# Patient Record
Sex: MALE | Race: WHITE | Employment: FULL TIME | URBAN - METROPOLITAN AREA
[De-identification: names, ages, dates, MRNs, and addresses within clinical notes are randomized per-mention and may not be internally consistent; named-entity substitution may affect disease eponyms.]

---

## 2018-07-17 ENCOUNTER — TELEPHONE (OUTPATIENT)
Dept: FAMILY MEDICINE CLINIC | Age: 48
End: 2018-07-17

## 2018-07-20 ENCOUNTER — HOSPITAL ENCOUNTER (OUTPATIENT)
Age: 48
Discharge: HOME | End: 2018-07-20
Payer: MEDICAID

## 2018-07-20 ENCOUNTER — OFFICE VISIT (OUTPATIENT)
Dept: FAMILY MEDICINE CLINIC | Age: 48
End: 2018-07-20
Payer: MEDICAID

## 2018-07-20 VITALS
HEIGHT: 68 IN | RESPIRATION RATE: 10 BRPM | WEIGHT: 129.4 LBS | TEMPERATURE: 97.9 F | BODY MASS INDEX: 19.61 KG/M2 | HEART RATE: 60 BPM | DIASTOLIC BLOOD PRESSURE: 62 MMHG | SYSTOLIC BLOOD PRESSURE: 95 MMHG

## 2018-07-20 DIAGNOSIS — R52 ACHES: ICD-10-CM

## 2018-07-20 DIAGNOSIS — R76.8 IGG GLIADIN ANTIBODY POSITIVE: ICD-10-CM

## 2018-07-20 DIAGNOSIS — R53.83 TIREDNESS: ICD-10-CM

## 2018-07-20 DIAGNOSIS — D72.10 EOSINOPHILIA: ICD-10-CM

## 2018-07-20 DIAGNOSIS — M54.40 ACUTE RIGHT-SIDED LOW BACK PAIN WITH SCIATICA, SCIATICA LATERALITY UNSPECIFIED: ICD-10-CM

## 2018-07-20 DIAGNOSIS — M75.02 ADHESIVE CAPSULITIS OF LEFT SHOULDER: Primary | ICD-10-CM

## 2018-07-20 DIAGNOSIS — J45.20 MILD INTERMITTENT ASTHMA WITHOUT COMPLICATION: ICD-10-CM

## 2018-07-20 DIAGNOSIS — R73.9 HYPERGLYCEMIA: ICD-10-CM

## 2018-07-20 DIAGNOSIS — K21.00 GASTROESOPHAGEAL REFLUX DISEASE WITH ESOPHAGITIS: ICD-10-CM

## 2018-07-20 DIAGNOSIS — F90.9 ATTENTION DEFICIT HYPERACTIVITY DISORDER (ADHD), UNSPECIFIED ADHD TYPE: ICD-10-CM

## 2018-07-20 DIAGNOSIS — M75.01 ADHESIVE CAPSULITIS OF RIGHT SHOULDER: ICD-10-CM

## 2018-07-20 DIAGNOSIS — E78.2 MIXED HYPERLIPIDEMIA: ICD-10-CM

## 2018-07-20 DIAGNOSIS — M75.02 ADHESIVE CAPSULITIS OF LEFT SHOULDER: ICD-10-CM

## 2018-07-20 PROBLEM — M54.50 LOW BACK PAIN: Status: ACTIVE | Noted: 2018-07-20

## 2018-07-20 PROBLEM — J45.909 ASTHMA: Status: ACTIVE | Noted: 2018-07-20

## 2018-07-20 PROBLEM — E78.5 HLD (HYPERLIPIDEMIA): Status: ACTIVE | Noted: 2018-07-20

## 2018-07-20 LAB
ALBUMIN SERPL-MCNC: 4.8 G/DL (ref 3.5–5.1)
ALP BLD-CCNC: 40 U/L (ref 38–126)
ALT SERPL-CCNC: 15 U/L (ref 11–66)
AMYLASE: 69 U/L (ref 20–104)
ANION GAP SERPL CALCULATED.3IONS-SCNC: 15 MEQ/L (ref 8–16)
AST SERPL-CCNC: 16 U/L (ref 5–40)
AVERAGE GLUCOSE: 96 MG/DL (ref 70–126)
BASOPHILS # BLD: 0.9 %
BASOPHILS ABSOLUTE: 0 THOU/MM3 (ref 0–0.1)
BILIRUB SERPL-MCNC: 0.6 MG/DL (ref 0.3–1.2)
BILIRUBIN DIRECT: < 0.2 MG/DL (ref 0–0.3)
BUN BLDV-MCNC: 11 MG/DL (ref 7–22)
CALCIUM SERPL-MCNC: 9.8 MG/DL (ref 8.5–10.5)
CHLORIDE BLD-SCNC: 97 MEQ/L (ref 98–111)
CHOLESTEROL, TOTAL: 200 MG/DL (ref 100–199)
CO2: 28 MEQ/L (ref 23–33)
CORTISOL COLLECTION INFO: NORMAL
CORTISOL: 8.35 UG/DL
CREAT SERPL-MCNC: 0.7 MG/DL (ref 0.4–1.2)
EOSINOPHIL # BLD: 2 %
EOSINOPHILS ABSOLUTE: 0.1 THOU/MM3 (ref 0–0.4)
ERYTHROCYTE [DISTWIDTH] IN BLOOD BY AUTOMATED COUNT: 12.5 % (ref 11.5–14.5)
ERYTHROCYTE [DISTWIDTH] IN BLOOD BY AUTOMATED COUNT: 43.2 FL (ref 35–45)
ESTRADIOL LEVEL: 17.9 PG/ML
FOLATE: > 20 NG/ML (ref 4.8–24.2)
GFR SERPL CREATININE-BSD FRML MDRD: > 90 ML/MIN/1.73M2
GLUCOSE BLD-MCNC: 90 MG/DL (ref 70–108)
HBA1C MFR BLD: 5.2 % (ref 4.4–6.4)
HCT VFR BLD CALC: 44 % (ref 42–52)
HDLC SERPL-MCNC: 68 MG/DL
HEMOGLOBIN: 14.4 GM/DL (ref 14–18)
IMMATURE GRANS (ABS): 0.01 THOU/MM3 (ref 0–0.07)
IMMATURE GRANULOCYTES: 0.2 %
IRON: 116 UG/DL (ref 65–195)
LDL CHOLESTEROL CALCULATED: 118 MG/DL
LIPASE: 40.9 U/L (ref 5.6–51.3)
LYMPHOCYTES # BLD: 36.5 %
LYMPHOCYTES ABSOLUTE: 2 THOU/MM3 (ref 1–4.8)
MAGNESIUM: 2.1 MG/DL (ref 1.6–2.4)
MCH RBC QN AUTO: 30.8 PG (ref 26–33)
MCHC RBC AUTO-ENTMCNC: 32.7 GM/DL (ref 32.2–35.5)
MCV RBC AUTO: 94.2 FL (ref 80–94)
MONOCYTES # BLD: 8 %
MONOCYTES ABSOLUTE: 0.4 THOU/MM3 (ref 0.4–1.3)
NUCLEATED RED BLOOD CELLS: 0 /100 WBC
PLATELET # BLD: 230 THOU/MM3 (ref 130–400)
PMV BLD AUTO: 10.8 FL (ref 9.4–12.4)
POTASSIUM SERPL-SCNC: 3.6 MEQ/L (ref 3.5–5.2)
PTH INTACT: 46.4 PG/ML (ref 15–65)
RBC # BLD: 4.67 MILL/MM3 (ref 4.7–6.1)
RHEUMATOID FACTOR: < 10 IU/ML (ref 0–13)
SEDIMENTATION RATE, ERYTHROCYTE: 2 MM/HR (ref 0–10)
SEG NEUTROPHILS: 52.4 %
SEGMENTED NEUTROPHILS ABSOLUTE COUNT: 2.8 THOU/MM3 (ref 1.8–7.7)
SODIUM BLD-SCNC: 140 MEQ/L (ref 135–145)
T3 TOTAL: 101 NG/DL (ref 72–181)
T4 FREE: 1.65 NG/DL (ref 0.93–1.76)
TOTAL IRON BINDING CAPACITY: 289 UG/DL (ref 171–450)
TOTAL PROTEIN: 7.5 G/DL (ref 6.1–8)
TRIGL SERPL-MCNC: 70 MG/DL (ref 0–199)
TSH SERPL DL<=0.05 MIU/L-ACNC: 1.38 UIU/ML (ref 0.4–4.2)
URIC ACID: 4.1 MG/DL (ref 3.7–7)
VITAMIN B-12: > 2000 PG/ML (ref 211–911)
VITAMIN D 25-HYDROXY: 43 NG/ML (ref 30–100)
WBC # BLD: 5.4 THOU/MM3 (ref 4.8–10.8)

## 2018-07-20 PROCEDURE — 84481 FREE ASSAY (FT-3): CPT

## 2018-07-20 PROCEDURE — 36415 COLL VENOUS BLD VENIPUNCTURE: CPT

## 2018-07-20 PROCEDURE — 84436 ASSAY OF TOTAL THYROXINE: CPT

## 2018-07-20 PROCEDURE — 84439 ASSAY OF FREE THYROXINE: CPT

## 2018-07-20 PROCEDURE — 84432 ASSAY OF THYROGLOBULIN: CPT

## 2018-07-20 PROCEDURE — 82150 ASSAY OF AMYLASE: CPT

## 2018-07-20 PROCEDURE — 83735 ASSAY OF MAGNESIUM: CPT

## 2018-07-20 PROCEDURE — 84403 ASSAY OF TOTAL TESTOSTERONE: CPT

## 2018-07-20 PROCEDURE — 82627 DEHYDROEPIANDROSTERONE: CPT

## 2018-07-20 PROCEDURE — 83540 ASSAY OF IRON: CPT

## 2018-07-20 PROCEDURE — 82746 ASSAY OF FOLIC ACID SERUM: CPT

## 2018-07-20 PROCEDURE — 82785 ASSAY OF IGE: CPT

## 2018-07-20 PROCEDURE — 86141 C-REACTIVE PROTEIN HS: CPT

## 2018-07-20 PROCEDURE — 84550 ASSAY OF BLOOD/URIC ACID: CPT

## 2018-07-20 PROCEDURE — G8427 DOCREV CUR MEDS BY ELIG CLIN: HCPCS | Performed by: FAMILY MEDICINE

## 2018-07-20 PROCEDURE — 1036F TOBACCO NON-USER: CPT | Performed by: FAMILY MEDICINE

## 2018-07-20 PROCEDURE — 86038 ANTINUCLEAR ANTIBODIES: CPT

## 2018-07-20 PROCEDURE — 99204 OFFICE O/P NEW MOD 45 MIN: CPT | Performed by: FAMILY MEDICINE

## 2018-07-20 PROCEDURE — 85651 RBC SED RATE NONAUTOMATED: CPT

## 2018-07-20 PROCEDURE — 86800 THYROGLOBULIN ANTIBODY: CPT

## 2018-07-20 PROCEDURE — 84270 ASSAY OF SEX HORMONE GLOBUL: CPT

## 2018-07-20 PROCEDURE — 82608 B-12 BINDING CAPACITY: CPT

## 2018-07-20 PROCEDURE — 82248 BILIRUBIN DIRECT: CPT

## 2018-07-20 PROCEDURE — 82607 VITAMIN B-12: CPT

## 2018-07-20 PROCEDURE — 80053 COMPREHEN METABOLIC PANEL: CPT

## 2018-07-20 PROCEDURE — 82670 ASSAY OF TOTAL ESTRADIOL: CPT

## 2018-07-20 PROCEDURE — 83550 IRON BINDING TEST: CPT

## 2018-07-20 PROCEDURE — 82533 TOTAL CORTISOL: CPT

## 2018-07-20 PROCEDURE — 85025 COMPLETE CBC W/AUTO DIFF WBC: CPT

## 2018-07-20 PROCEDURE — 86430 RHEUMATOID FACTOR TEST QUAL: CPT

## 2018-07-20 PROCEDURE — 83090 ASSAY OF HOMOCYSTEINE: CPT

## 2018-07-20 PROCEDURE — 83525 ASSAY OF INSULIN: CPT

## 2018-07-20 PROCEDURE — 84681 ASSAY OF C-PEPTIDE: CPT

## 2018-07-20 PROCEDURE — 83516 IMMUNOASSAY NONANTIBODY: CPT

## 2018-07-20 PROCEDURE — 83036 HEMOGLOBIN GLYCOSYLATED A1C: CPT

## 2018-07-20 PROCEDURE — 82306 VITAMIN D 25 HYDROXY: CPT

## 2018-07-20 PROCEDURE — 82024 ASSAY OF ACTH: CPT

## 2018-07-20 PROCEDURE — 83690 ASSAY OF LIPASE: CPT

## 2018-07-20 PROCEDURE — 86376 MICROSOMAL ANTIBODY EACH: CPT

## 2018-07-20 PROCEDURE — 84480 ASSAY TRIIODOTHYRONINE (T3): CPT

## 2018-07-20 PROCEDURE — G8420 CALC BMI NORM PARAMETERS: HCPCS | Performed by: FAMILY MEDICINE

## 2018-07-20 PROCEDURE — 84443 ASSAY THYROID STIM HORMONE: CPT

## 2018-07-20 PROCEDURE — 86235 NUCLEAR ANTIGEN ANTIBODY: CPT

## 2018-07-20 PROCEDURE — 82626 DEHYDROEPIANDROSTERONE: CPT

## 2018-07-20 PROCEDURE — 83970 ASSAY OF PARATHORMONE: CPT

## 2018-07-20 PROCEDURE — 80061 LIPID PANEL: CPT

## 2018-07-20 RX ORDER — AMPICILLIN TRIHYDRATE 250 MG
1000 CAPSULE ORAL
COMMUNITY
Start: 2017-03-27

## 2018-07-20 RX ORDER — FLUTICASONE PROPIONATE 50 MCG
2 SPRAY, SUSPENSION (ML) NASAL PRN
COMMUNITY
Start: 2014-11-25

## 2018-07-20 RX ORDER — ALBUTEROL SULFATE 90 UG/1
1 AEROSOL, METERED RESPIRATORY (INHALATION)
COMMUNITY
Start: 2017-03-27

## 2018-07-20 RX ORDER — MELATONIN
1000
COMMUNITY
Start: 2017-03-27

## 2018-07-20 RX ORDER — DEXTROAMPHETAMINE SACCHARATE, AMPHETAMINE ASPARTATE, DEXTROAMPHETAMINE SULFATE AND AMPHETAMINE SULFATE 7.5; 7.5; 7.5; 7.5 MG/1; MG/1; MG/1; MG/1
TABLET ORAL
Refills: 0 | COMMUNITY
Start: 2018-07-03

## 2018-07-20 RX ORDER — ASCORBIC ACID 500 MG
1000 TABLET ORAL EVERY OTHER DAY
COMMUNITY

## 2018-07-20 ASSESSMENT — PATIENT HEALTH QUESTIONNAIRE - PHQ9
2. FEELING DOWN, DEPRESSED OR HOPELESS: 0
SUM OF ALL RESPONSES TO PHQ QUESTIONS 1-9: 0
1. LITTLE INTEREST OR PLEASURE IN DOING THINGS: 0
SUM OF ALL RESPONSES TO PHQ9 QUESTIONS 1 & 2: 0

## 2018-07-20 ASSESSMENT — ENCOUNTER SYMPTOMS: TROUBLE SWALLOWING: 1

## 2018-07-20 NOTE — PROGRESS NOTES
Subjective:      Patient ID: Argentina Perez is a 49 y.o. male. HPI Argentina Perez is a 49 y.o. White male. Javier  presents to the [facility] today for   Chief Complaint   Patient presents with    Established New Doctor     viry montelongo- wants to reconnect, gluten intolerance    Shoulder Pain     frozen shoulder- left   ,  and;   1. Adhesive capsulitis of left shoulder    2. Tiredness    3. Eosinophilia    4. Gastroesophageal reflux disease with esophagitis    5. Aches    6. IgG Gliadin antibody positive    7. Attention deficit hyperactivity disorder (ADHD), unspecified ADHD type    8. Adhesive capsulitis of right shoulder    9. Mild intermittent asthma without complication    10. Mixed hyperlipidemia    11. Hyperglycemia    12. Acute right-sided low back pain with sciatica, sciatica laterality unspecified      I have reviewed Javier William medical, surgical and other pertinent history in detail, and have updated medication and allergy information in the computerized patient record. Clinical Care Team:     -Referring Provider for today's consult: Self Referred  -Primary Care Provider: Jatin Hartmann    Medical/Surgical History:   He  has no past medical history on file. His  has no past surgical history on file. Family/Social History:     His family history is not on file. He  reports that he has never smoked. He has never used smokeless tobacco. He reports that he does not drink alcohol or use drugs. Medications/Allergies/Immunizations:     His current medication(s) include   Current Outpatient Prescriptions:     albuterol sulfate  (90 Base) MCG/ACT inhaler, Inhale 1 puff into the lungs, Disp: , Rfl:     amphetamine-dextroamphetamine (ADDERALL) 30 MG tablet, TAKE 0.5 TABLETS BY MOUTH 2 TIMES DAILY. , Disp: , Rfl: 0    B Complex Vitamins (B COMPLEX 100 PO), Take by mouth daily, Disp: , Rfl:     Cinnamon 500 MG CAPS, Take 1,000 mg by mouth 3 times daily (with meals), reviewed and are negative. Objective:   Physical Exam   Constitutional: He is oriented to person, place, and time. He appears well-developed and well-nourished. HENT:   Head: Normocephalic. Pulmonary/Chest: Effort normal.   Neurological: He is alert and oriented to person, place, and time. Psychiatric: He has a normal mood and affect. Thought content normal.   Nursing note and vitals reviewed. Assessment:      Laboratory Data:   Lab results were searched in Care Everywhere and/or those brought by the pateint were reviewed today with Javier and he has a copy of their most recent labs to take home with them as noted below;       Imaging Data:   Imaging Data:       Assessment & Plan:       Impression:  1. Adhesive capsulitis of left shoulder    2. Tiredness    3. Eosinophilia    4. Gastroesophageal reflux disease with esophagitis    5. Aches    6. IgG Gliadin antibody positive    7. Attention deficit hyperactivity disorder (ADHD), unspecified ADHD type    8. Adhesive capsulitis of right shoulder    9. Mild intermittent asthma without complication    10. Mixed hyperlipidemia    11. Hyperglycemia    12. Acute right-sided low back pain with sciatica, sciatica laterality unspecified      Assessment and Plan:  After reviewing the patients chief complaints, reviewing their lab findings in great detail (with the patient and those accompanying them) which correlate to their chief complaints, symptoms, and or medical conditions; suggestions were made relating to changes in diet and or supplements which may improve the complaints and which will be reflected in their future lab findings;   Chief Complaint   Patient presents with    Established New Doctor     viry montelongo- wants to reconnect, gluten intolerance    Shoulder Pain     frozen shoulder- left   ;    Plans for the next visits:  - Abnormal and non-optimal Labs were ordered today to be repeated in the next 120-365 days to assess changes from adjustments in copy given to the patient     - [id number]. [employer] web site offered to patient to review at their convenience by staff with login information    - www.efaeducation. org site reviewed with the patient so they can identify better foods    - www.cornicopia. org site reviewed with the patient so they can reduce carrageenan by reviewing the carrageenan buyers guide on that site and picking safer foods    Note:   I have discussed with the patient that with all nutraceuticals, there is often mixed data and emerging research which needs to be monitored; as well as an array of NIH fact sheets on nutrients and supplements. If I have recommended cinnamon at the request of this patient to assist them in control of their blood sugar, triglyceride and or weight issues. I discussed that the patient's clinical use of cinnamon bark, calcium, magnesium, Vitamin D and pharmaceutical grade [employer]S #[id number] fish oil or triple-strength fish oil, and balanced B-50 or B-100 time-released B complex which does not contain Vit C in the tablet. The dose will be for a time-limited trial to determine their individual effectiveness and tolerance in this patient. I also referred the patient to the reviewing such items as consumerlabs. com NMCD: Nutrition, Metabolism, and Cardiovascular Diseases (journal) and NCAAM.gov,  Searching www.nih.gov for any concerns about long-term use and hepatotoxicity of cinnamon and other nutrients and suggest they frequently search nih.gov for the latest non-proprietary information on nutriceuticals as well as consider a subscription to Kuaidi Dache for details on reviewed supplements, or at the least review the nutrient files at [facility] at [facility], [address] bark, an insulin mimetic, reduces some High Carbohydrate Dietary Impacts.   Methylhydroxychalcone polymers insulin-enhancing properties in fat cells are responsible for enhanced glucose uptake, inhibiting beets, blueberries, broccoli, cabbage, cantaloupe, carrots, cauliflower, celery, cucumbers, eggplant, grapes, green onions, greens, lettuce, onions, parsley, peas, peaches, bell peppers, potatoes, radishes, summer raspberries, squash, sweet corn, tomatoes, turnips, watermelons  August; apples, beans, beets, blueberries, cabbage, cantaloupe, carrots, cauliflower, celery, cucumbers, eggplant, grapes, green onions, greens, lettuce, onions, parsley, peas, peaches, pears, bell peppers, potatoes, radishes, squash, sweet corn, tomatoes, turnips, watermelons  September; apples, beans, beets, blueberries, cabbage, cantaloupe, carrots, cauliflower, celery, cucumbers, eggplant, grapes, green onions, greens, lettuce, onions, parsley, peas, peaches, pears, bell peppers, plums, potatoes, pumpkins, radishes, fall red raspberries, squash, sweet corn, tomatoes, turnips, watermelons  October; apples, beets, broccoli, cabbage, carrots, cauliflower, celery, green onions, greens, lettuce, parsley, peas, pears, potatoes, pumpkins, radishes, fall red raspberries, squash, turnips  November; broccoli, cabbage, carrots, parsley, pears, peas  December: use canned, frozen or dried fruits since lower in latex    Up to half of latex-sensitive patients show allergic reactions to fruits (avocados, bananas, kiwifruits, papayas, peaches),   Annals of Allergy, 1994. These plants contain the same proteins that are allergens in latex. People with fruit allergies should warn physicians before undergoing procedures which may cause anaphylactic reaction if in contact with latex gloves. Some of the common foods with defined cross-reactivity to latex are avocado, banana, kiwi, chestnut, raw potato, tomato, stone fruits (e.g., peach, cherry), hazelnut, melons, celery, carrot, apple, pear, papaya, and almond.   Foods with less well-defined cross-reactivity to latex are peanuts, peppers, citrus fruits, coconut, pineapple, abril, fig, passion fruit, Ugli fruit, and grape    This fruit/latex cross-reactivity is worsened by ethylene, a gas used to hasten commercial ripening. In nature, plants produce low levels of the hormone ethylene, which regulates germination, flowering, and ripening. Forced ripening by high ethylene concentrations, plants produce allergenic wound-repair proteins, which are similar to wound-repair proteins made during the tapping of rubber trees. Sensitive individuals who ingest the fruit get a higher dose and worse reaction. Some people may even first become sensitized to latex through fruit. Can food processing increase the concentrations of allergenic proteins? Latex-sensitized children (and adults) in [place] often experience allergic reactions after eating bananas ripened artificially with ethylene. In the [facility], food distribution centers treat unripe bananas and other produce with ethylene to ripen; not commonly done in [facility] since fruit is tree-ripened there. Does treatment of food with ethylene induce banana proteins that cross-react with latex? (Awilda et al.    References:   Latex in Foods Allergy, http://ehp.niehs.nih.gov/members/2003/5811/5811.html    Search web for \" Whats in Season \" for where you live or are at the time you food shop  www.nutritioncouncil.org/pdf/healthy/SeasonalProduce. pdf ,   Management of Latex, http://medicalcenter. [employer].edu/  search for latex

## 2018-07-21 LAB
C-PEPTIDE: 1.4 NG/ML (ref 1.1–4.4)
IGE: 18 IU/ML
INSULIN, RANDOM OR FASTING: NORMAL
T3 FREE: 3.31 PG/ML (ref 2.02–4.43)
THYROXINE (T4): 8.7 UG/DL (ref 4.5–12)

## 2018-07-22 LAB
ACTH: 24 PG/ML (ref 7–69)
ANA SCREEN: NORMAL
C-REACTIVE PROTEIN, HIGH SENSITIVITY: 0.3 MG/L
DHEAS (DHEA SULFATE): 165 UG/DL (ref 95–530)
GLIADIN PEPTIDE IGG, IGA: NORMAL
HOMOCYSTEINE, TOTAL: 6 UMOL/L
TESTOSTERONE FREE: NORMAL
THYROGLOBULIN: NORMAL
THYROID PEROXIDASE ANTIBODY: 1.7 IU/ML (ref 0–9)

## 2018-07-23 LAB
ANTI-SMITH: 0 AU/ML (ref 0–40)
VITAMIN B12 BIND CAP: NORMAL

## 2018-07-25 LAB — DHEA UNCONJUGATED: 3.69 NG/ML (ref 0.63–4.7)

## 2022-05-26 ENCOUNTER — TELEPHONE (OUTPATIENT)
Dept: FAMILY MEDICINE CLINIC | Age: 52
End: 2022-05-26

## 2022-05-26 NOTE — TELEPHONE ENCOUNTER
PT last visit was in 7/20/2018 for a my chart appointment. Has not ever been in this office. Technically he is a new pateint, however, he now lives in around [address] [place] [address] and wants to know of a good inogrative doctor in that area. Advised he may google one for his area or check with his insurance to see whom would be covered. Said he wants one that does what Dr. Evelyne Escoto does. Told him no one does what Dr. Evelyne Escoto does. He still insisted I check with Dr Evelyne Escoto for any recommendations. Please advise.